# Patient Record
Sex: FEMALE | Race: WHITE | NOT HISPANIC OR LATINO | URBAN - METROPOLITAN AREA
[De-identification: names, ages, dates, MRNs, and addresses within clinical notes are randomized per-mention and may not be internally consistent; named-entity substitution may affect disease eponyms.]

---

## 2022-08-15 ENCOUNTER — APPOINTMENT (RX ONLY)
Dept: URBAN - METROPOLITAN AREA CLINIC 98 | Facility: CLINIC | Age: 72
Setting detail: DERMATOLOGY
End: 2022-08-15

## 2022-08-15 DIAGNOSIS — L81.0 POSTINFLAMMATORY HYPERPIGMENTATION: ICD-10-CM

## 2022-08-15 DIAGNOSIS — L85.3 XEROSIS CUTIS: ICD-10-CM

## 2022-08-15 PROCEDURE — ? COUNSELING

## 2022-08-15 PROCEDURE — 99203 OFFICE O/P NEW LOW 30 MIN: CPT

## 2022-08-15 PROCEDURE — ? PRESCRIPTION

## 2022-08-15 PROCEDURE — ? TREATMENT REGIMEN

## 2022-08-15 RX ORDER — TACROLIMUS 1 MG/G
OINTMENT TOPICAL
Qty: 60 | Refills: 0 | Status: ERX | COMMUNITY
Start: 2022-08-15

## 2022-08-15 RX ORDER — TACROLIMUS 1 MG/G
OINTMENT TOPICAL
Qty: 60 | Refills: 0 | Status: ERX

## 2022-08-15 RX ADMIN — TACROLIMUS: 1 OINTMENT TOPICAL at 00:00

## 2022-08-15 ASSESSMENT — LOCATION ZONE DERM
LOCATION ZONE: VULVA
LOCATION ZONE: LEG

## 2022-08-15 ASSESSMENT — LOCATION DETAILED DESCRIPTION DERM
LOCATION DETAILED: MONS PUBIS
LOCATION DETAILED: LEFT ANTERIOR PROXIMAL THIGH

## 2022-08-15 ASSESSMENT — LOCATION SIMPLE DESCRIPTION DERM
LOCATION SIMPLE: GROIN
LOCATION SIMPLE: LEFT THIGH

## 2022-08-15 NOTE — HPI: RASH (LICHEN SCLEROSUS ET ATROPHICUS)
How Severe Is It?: mild
Is This A New Presentation, Or A Follow-Up?: Follow Up Lichen Sclerosus et Atrophicus
Additional History: Patient was treated for a yeast infection for the past several months. PCP wants us to examine for Lichen sclerosus.

## 2022-08-15 NOTE — PROCEDURE: TREATMENT REGIMEN
Plan: Patient advised it could possibly be from LSA, no erythematous plaques visible during exam. We discussed a biopsy in the future if lesions flare up but patient appeared resistant to do the biopsy
Detail Level: Zone
Initiate Treatment: tacrolimus 0.1 % topical ointment \\nSig: Apply BID to discolored spots

## 2022-09-16 ENCOUNTER — APPOINTMENT (RX ONLY)
Dept: URBAN - METROPOLITAN AREA CLINIC 98 | Facility: CLINIC | Age: 72
Setting detail: DERMATOLOGY
End: 2022-09-16

## 2022-09-16 DIAGNOSIS — L81.0 POSTINFLAMMATORY HYPERPIGMENTATION: ICD-10-CM | Status: IMPROVED

## 2022-09-16 PROCEDURE — ? COUNSELING

## 2022-09-16 PROCEDURE — ? PRESCRIPTION

## 2022-09-16 PROCEDURE — ? TREATMENT REGIMEN

## 2022-09-16 PROCEDURE — 99212 OFFICE O/P EST SF 10 MIN: CPT

## 2022-09-16 RX ORDER — TACROLIMUS 1 MG/G
OINTMENT TOPICAL
Qty: 60 | Refills: 2 | Status: ERX

## 2022-09-16 ASSESSMENT — LOCATION DETAILED DESCRIPTION DERM: LOCATION DETAILED: MONS PUBIS

## 2022-09-16 ASSESSMENT — LOCATION SIMPLE DESCRIPTION DERM: LOCATION SIMPLE: GROIN

## 2022-09-16 ASSESSMENT — LOCATION ZONE DERM: LOCATION ZONE: VULVA

## 2022-09-16 NOTE — PROCEDURE: TREATMENT REGIMEN
Plan: Patient declines treatment. Advised to follow up with PCP
Detail Level: Zone
Continue Regimen: tacrolimus 0.1 % topical ointment \\nSig: Apply BID to discolored spots